# Patient Record
Sex: MALE | Race: BLACK OR AFRICAN AMERICAN | NOT HISPANIC OR LATINO | Employment: FULL TIME | URBAN - METROPOLITAN AREA
[De-identification: names, ages, dates, MRNs, and addresses within clinical notes are randomized per-mention and may not be internally consistent; named-entity substitution may affect disease eponyms.]

---

## 2019-06-28 ENCOUNTER — HOSPITAL ENCOUNTER (EMERGENCY)
Facility: HOSPITAL | Age: 64
Discharge: HOME | End: 2019-06-28
Attending: EMERGENCY MEDICINE
Payer: COMMERCIAL

## 2019-06-28 VITALS
HEART RATE: 100 BPM | WEIGHT: 205 LBS | RESPIRATION RATE: 18 BRPM | HEIGHT: 70 IN | SYSTOLIC BLOOD PRESSURE: 169 MMHG | OXYGEN SATURATION: 96 % | TEMPERATURE: 99 F | DIASTOLIC BLOOD PRESSURE: 93 MMHG | BODY MASS INDEX: 29.35 KG/M2

## 2019-06-28 DIAGNOSIS — S92.356A CLOSED NONDISPLACED FRACTURE OF FIFTH METATARSAL BONE, UNSPECIFIED LATERALITY, INITIAL ENCOUNTER: Primary | ICD-10-CM

## 2019-06-28 DIAGNOSIS — T14.8XXA CONTUSION: ICD-10-CM

## 2019-06-28 PROCEDURE — 99283 EMERGENCY DEPT VISIT LOW MDM: CPT | Mod: 25

## 2019-06-28 PROCEDURE — 29515 APPLICATION SHORT LEG SPLINT: CPT | Mod: LT

## 2019-06-28 RX ORDER — IBUPROFEN 600 MG/1
600 TABLET ORAL EVERY 6 HOURS PRN
Qty: 20 TABLET | Refills: 0 | Status: SHIPPED | OUTPATIENT
Start: 2019-06-28 | End: 2019-08-26

## 2019-06-29 NOTE — ED PROVIDER NOTES
Encounter Date: 6/28/2019       History     Chief Complaint   Patient presents with    Foot Injury     Patient was moving a container onto an airplane when he hit his foot on the side of the container. Local swelling and deformity noted to R ventral foot.      HPI  Review of patient's allergies indicates:  No Known Allergies  History reviewed. No pertinent past medical history.  History reviewed. No pertinent surgical history.  Family History   Problem Relation Age of Onset    Hypertension Mother     Cancer Maternal Grandmother      Social History     Tobacco Use    Smoking status: Current Every Day Smoker     Packs/day: 1.00     Years: 4.00     Pack years: 4.00     Types: Cigarettes    Smokeless tobacco: Never Used   Substance Use Topics    Alcohol use: Never     Frequency: Never    Drug use: Not on file     Review of Systems    Physical Exam     Initial Vitals [06/28/19 2055]   BP Pulse Resp Temp SpO2   (!) 169/93 100 18 98.5 °F (36.9 °C) 96 %      MAP       --         Physical Exam    ED Course   Procedures  Labs Reviewed - No data to display       Imaging Results          X-Ray Foot Complete Right (Final result)  Result time 06/28/19 22:02:25    Final result by Vikki Raman MD (06/28/19 22:02:25)                 Impression:      Acute nondisplaced fracture of the 5th metatarsal neck.  Mild dorsal soft tissue swelling.      Electronically signed by: Vikki Raman  Date:    06/28/2019  Time:    22:02             Narrative:    EXAMINATION:  THREE VIEWS OF THE RIGHT FOOT    CLINICAL HISTORY:  Other injury of unspecified body region, initial encounter    TECHNIQUE:  AP, lateral, and oblique view of the right foot    COMPARISON:  None.    FINDINGS:  Soft tissue swelling is noted over the dorsum of the foot.  Three views of the right foot demonstrate a nondisplaced acute fracture of the 5th metatarsal neck.  Also noted is or remote fracture involving a sesamoid bone at the plantar aspect of the 1st  metatarsal head.  Recommend correlation with point tenderness.  Moderate degenerative changes are seen involving the midfoot.  Moderate calcaneal spurring is noted.  Spurring is also seen at the base of the 5th metatarsal.                                                      Clinical Impression:   {Add your Clinical Impression here. If you haven't documented one yet, please pend the note, finalize a Clinical Impression, and refresh your note before signing.:[id number]}

## 2019-06-29 NOTE — ED PROVIDER NOTES
Encounter Date: 6/28/2019    SCRIBE #1 NOTE: I, Estephanie Pratt, am scribing for, and in the presence of,  Dr. Lefort. I have scribed the entire note.       History     Chief Complaint   Patient presents with    Foot Injury     Patient was moving a container onto an airplane when he hit his foot on the side of the container. Local swelling and deformity noted to R ventral foot.      Jarrell Carreno is a 63 y.o. male who  has no past medical history on file.    The patient presents to the ED due to right foot pain. He reports onset symptoms was just prior to arrival in the ED. 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 Since, then the patient has been having pain and swelling to the right foot. The patient denies any redness, drainage, open wounds, numbness, tingling or weakness in the right foot. He has not tried any medications for pain.     The history is provided by the patient.     Review of patient's allergies indicates:  No Known Allergies  History reviewed. No pertinent past medical history.  History reviewed. No pertinent surgical history.  Family History   Problem Relation Age of Onset    Hypertension Mother     Cancer Maternal Grandmother      Social History     Tobacco Use    Smoking status: Current Every Day Smoker     Packs/day: 1.00     Years: 4.00     Pack years: 4.00     Types: Cigarettes    Smokeless tobacco: Never Used   Substance Use Topics    Alcohol use: Never     Frequency: Never    Drug use: Not on file     Review of Systems   Constitutional: Negative for chills and fever.   HENT: Negative for congestion, ear pain, rhinorrhea and sore throat.    Respiratory: Negative for cough, shortness of breath and wheezing.    Cardiovascular: Negative for chest pain and palpitations.   Gastrointestinal: Negative for abdominal pain, diarrhea, nausea and vomiting.   Genitourinary: Negative for dysuria and hematuria.   Musculoskeletal: Negative for  back pain, myalgias and neck pain.        +right foot pain.    Skin: Negative for rash.   Neurological: Negative for dizziness, weakness, light-headedness and headaches.   Psychiatric/Behavioral: Negative for confusion.       Physical Exam     Initial Vitals [06/28/19 2055]   BP Pulse Resp Temp SpO2   (!) 169/93 100 18 98.5 °F (36.9 °C) 96 %      MAP       --         Physical Exam    Nursing note and vitals reviewed.  Constitutional: He appears well-developed and well-nourished. He is not diaphoretic. No distress.   HENT:   Head: Normocephalic and atraumatic.   Mouth/Throat: Oropharynx is clear and moist.   Eyes: Conjunctivae and EOM are normal.   Neck: Normal range of motion. Neck supple.   Cardiovascular: Intact distal pulses.   Pulmonary/Chest: No respiratory distress.   Musculoskeletal: Normal range of motion. He exhibits edema and tenderness.   Tenderness and swelling to dorsum of right foot. Normal ROM of malleoli without tenderness. 2+ DP and PT pulses.    Neurological: He is alert and oriented to person, place, and time. He has normal strength.   Skin: Skin is warm and dry. Capillary refill takes less than 2 seconds. No rash noted.         ED Course   Splint Application  Date/Time: 6/28/2019 10:38 PM  Performed by: Guy J. Lefort, MD  Authorized by: Guy J. Lefort, MD   Consent Done: Not Needed  Location details: right leg  Splint type: short leg  Supplies used: plaster  Post-procedure: The splinted body part was neurovascularly unchanged following the procedure.  Patient tolerance: Patient tolerated the procedure well with no immediate complications        Labs Reviewed - No data to display       Imaging Results          X-Ray Foot Complete Right (Final result)  Result time 06/28/19 22:02:25    Final result by Vikki Raman MD (06/28/19 22:02:25)                 Impression:      Acute nondisplaced fracture of the 5th metatarsal neck.  Mild dorsal soft tissue swelling.      Electronically signed  by: Vikki Lamine  Date:    06/28/2019  Time:    22:02             Narrative:    EXAMINATION:  THREE VIEWS OF THE RIGHT FOOT    CLINICAL HISTORY:  Other injury of unspecified body region, initial encounter    TECHNIQUE:  AP, lateral, and oblique view of the right foot    COMPARISON:  None.    FINDINGS:  Soft tissue swelling is noted over the dorsum of the foot.  Three views of the right foot demonstrate a nondisplaced acute fracture of the 5th metatarsal neck.  Also noted is or remote fracture involving a sesamoid bone at the plantar aspect of the 1st metatarsal head.  Recommend correlation with point tenderness.  Moderate degenerative changes are seen involving the midfoot.  Moderate calcaneal spurring is noted.  Spurring is also seen at the base of the 5th metatarsal.                                 Medical Decision Making:   Differential Diagnosis:   Fracture contusion sprain  Clinical Tests:   Radiological Study: Ordered and Reviewed  ED Management:  Nondisplaced 5th metatarsal fracture at noted.  Placed in short-leg splint and crutches with instructions to follow up with occupational medicine.  Referral placed for occupational health follow-up.                      Clinical Impression:     1. Closed nondisplaced fracture of fifth metatarsal bone, unspecified laterality, initial encounter    2. Contusion        Disposition:   Disposition: Discharged  Condition: Stable    Scribe attestation I, Dr. Guy Lefort, personally performed the services described in this documentation. All medical record entries made by the scribe were at my direction and in my presence. I have reviewed the chart and agree that the record reflects my personal performance and is accurate and complete. Guy Lefort, MD.  10:17 PM 06/28/2019                      Guy J. Lefort, MD  06/28/19 [id number]

## 2019-06-29 NOTE — DISCHARGE INSTRUCTIONS
Follow-up with [facility] on Monday for work restriction follow-up and further management

## 2019-06-29 NOTE — ED TRIAGE NOTES
PT [place] when one of the canisters rolled onto the side of his foot. Pt denies numbness and tingling. Pt states he can feel touch to foot. Pt states pain is 7 out of 10. Pt states he came straight from work- has not taken anything for pain or swelling. Pt states he takes 1 Hickman's aspirin per day.

## 2019-07-01 ENCOUNTER — OFFICE VISIT (OUTPATIENT)
Dept: URGENT CARE | Facility: CLINIC | Age: 64
End: 2019-07-01
Payer: COMMERCIAL

## 2019-07-01 VITALS
BODY MASS INDEX: 29.4 KG/M2 | HEIGHT: 71 IN | WEIGHT: 210 LBS | DIASTOLIC BLOOD PRESSURE: 90 MMHG | SYSTOLIC BLOOD PRESSURE: 160 MMHG | TEMPERATURE: 98 F | HEART RATE: 88 BPM
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
## 2019-07-01 DIAGNOSIS — S92.354A CLOSED NONDISPLACED FRACTURE OF FIFTH METATARSAL BONE OF RIGHT FOOT, INITIAL ENCOUNTER: Primary | ICD-10-CM

## 2019-07-01 PROCEDURE — 99203 PR OFFICE/OUTPT VISIT, NEW, LEVL III, 30-44 MIN: ICD-10-PCS | Mod: S$GLB,,, | Performed by: PHYSICIAN ASSISTANT

## 2019-07-01 PROCEDURE — 99203 OFFICE O/P NEW LOW 30 MIN: CPT | Mod: S$GLB,,, | Performed by: PHYSICIAN ASSISTANT

## 2019-07-01 NOTE — LETTER
[facility] [place]  [address] [address]  Phone: [phone]  Fax: [phone]  [facility]: [phone]    Pt Name: Jarrell Carreno Jr.  Injury Date: 06/28/2019   Employee ID:  Date of FIRST Treatment: 07/01/2019   Company: [employer]      Appointment Time: 10:40 AM Arrived: 1030 AM   Provider: Andrew Ruby PA-C Time Out: 1305 PM      Office Treatment:   EXAM  REVIEWED RECORDS  REFERRAL TO ORTHO ONCE APPROVED  DISCHARGED TO ORTHO ONCE APPROVED      1. Closed nondisplaced fracture of fifth metatarsal bone of right foot, initial encounter    2. Work related injury          Patient Instructions: Referral to specialist to be scheduled, once authorized, Use splint as directed, Use Crutches as directed      Restrictions: Discharged to Ortho/Neuro/Opthomologist/Surgeon, Sedentary work only, Sit down work only     Return Appointment: NONE

## 2019-07-01 NOTE — PATIENT INSTRUCTIONS
Foot Fracture  You have a broken bone (fracture) in your foot. This will cause pain, swelling, and often bruising. It will usually take about 4 to 8 weeks to heal. A foot fracture may be treated with a special shoe, splint, cast, or boot.  Home care  Follow these guidelines when caring for yourself at home:  · You may be given a splint, cast, shoe, or boot to keep the injured area from moving. Unless you were told otherwise, use crutches or a walker. Dont put weight on the injured foot until your health care provider says you can do so. (You can rent crutches and a walker at many pharmacies and surgical or orthopedic supply stores.) Dont put weight on a splint, or it will break.  · Keep your leg elevated to reduce pain and swelling. When sleeping, put a pillow under the injured leg. When sitting, support the injured leg so it is above your waist. This is very important during the first 2 days (48 hours).  · Put an ice pack on the injured area. Do this for 20 minutes every 1 to 2 hours the first day for pain relief. You can make an ice pack by wrapping a plastic bag of ice cubes in a thin towel. As the ice melts, be careful that the splint, cast, boot, or shoe doesnt get wet. You can place the ice pack directly over the splint or cast. Unless told otherwise, you can open the boot or shoe to apply the ice pack. Continue using the ice pack 3 to 4 times a day for the next 2 days. Then use the ice pack as needed to ease pain and swelling.  · Keep the splint, cast, boot, or shoe dry. When bathing, protect it with a large plastic bag, rubber-banded at the top end. If a fiberglass splint or cast or boot gets wet, you can dry it with a hair dryer. Unless told otherwise, you can take off the boot or shoe to bathe.  · You may use acetaminophen or ibuprofen to control pain, unless another pain medicine was prescribed. If you have chronic liver or kidney disease, talk with your healthcare provider before using these  medicines. Also talk with your provider if youve had a stomach ulcer or gastrointestinal bleeding.  · Dont put creams or objects under the cast if you have itching.  Follow-up care  Follow up with your healthcare provider, or as advised. This is to make sure the bone is healing the way it should. If you were given a splint, it may be changed to a cast or boot at your follow-up visit.  X-rays may be taken. You will be told of any new findings that may affect your care.  When to seek medical advice  Call your healthcare provider right away if any of these occur:  · The cast or splint cracks  · The plaster cast or splint becomes wet or soft  · The fiberglass cast or splint stays wet for more than 24 hours  · Bad odor from the cast or wound fluid stains the cast  · Tightness or pain under the cast or splint gets worse  · Toes become swollen, cold, blue, numb, or tingly  · You cant move your toes  · Skin around cast or splint becomes red  · Fever of 100.4ºF (38ºC) or higher, or as directed by your healthcare provider  Date Last Reviewed: 2/1/2017  © 2434-2349 [employer]. [address] [place] [address], [place] [address]. All rights reserved. This information is not intended as a substitute for professional medical care. Always follow your healthcare professional's instructions.

## 2019-07-01 NOTE — PROGRESS NOTES
Subjective:       Patient ID: Jarrell Carreno Jr. is a 63 y.o. male.    Chief Complaint: Foot Injury (right 6/28/19)    Patient is  [employer]  for  [employer]. Patient is a follow up from ED 6/28/19 for a right acute non displaced fx of 5th metatarsal. Wearing splint, crutches, advil/ibuprofen for pain 6/10. mjb    Foot Injury    The incident occurred 3 to 5 days ago. The incident occurred at work. The injury mechanism was a direct blow. The pain is present in the right foot. The pain is at a severity of 6/10. The pain is moderate. The pain has been constant since onset. Associated symptoms include an inability to bear weight. He reports no foreign bodies present. The symptoms are aggravated by weight bearing, movement and palpation. He has tried non-weight bearing and NSAIDs for the symptoms. The treatment provided mild relief.     Review of Systems   Constitution: Negative for chills and fever.   HENT: Negative for sore throat.    Eyes: Negative for blurred vision.   Cardiovascular: Negative for chest pain.   Respiratory: Negative for shortness of breath.    Skin: Negative for rash.   Musculoskeletal: Positive for joint pain. Negative for back pain.   Gastrointestinal: Negative for abdominal pain, diarrhea, nausea and vomiting.   Neurological: Negative for headaches.   Psychiatric/Behavioral: The patient is not nervous/anxious.        Objective:      Physical Exam   Constitutional: He appears well-developed and well-nourished. He is active. No distress.   HENT:   Head: Normocephalic and atraumatic.   Right Ear: Hearing and external ear normal.   Left Ear: Hearing and external ear normal.   Nose: Nose normal. No nasal deformity. No epistaxis.   Mouth/Throat: Oropharynx is clear and moist and mucous membranes are normal.   Eyes: Conjunctivae and lids are normal. No scleral icterus.   Neck: Trachea normal and normal range of motion.   Cardiovascular: Intact distal pulses and normal pulses.    Pulmonary/Chest: Effort normal. No stridor. No respiratory distress.   Musculoskeletal:        Right foot: There is decreased range of motion, tenderness and swelling. There is normal capillary refill and no deformity.        Feet:    Neurological: He is alert. He has normal strength. He is not disoriented. No sensory deficit. GCS eye subscore is 4. GCS verbal subscore is 5. GCS motor subscore is 6.   Skin: Skin is warm, dry and intact. Capillary refill takes less than 2 seconds. He is not diaphoretic.   Psychiatric: He has a normal mood and affect. His speech is normal and behavior is normal. He is attentive.   Nursing note and vitals reviewed.      Assessment:       1. Closed nondisplaced fracture of fifth metatarsal bone of right foot, initial encounter    2. Work related injury        Plan:            Patient Instructions: Referral to specialist to be scheduled, once authorized, Use splint as directed, Use Crutches as directed   Restrictions: Discharged to Ortho/Neuro/Opthomologist/Surgeon, Sedentary work only, Sit down work only  Follow up if symptoms worsen or fail to improve.

## 2019-07-19 ENCOUNTER — OFFICE VISIT (OUTPATIENT)
Dept: PODIATRY | Facility: CLINIC | Age: 64
End: 2019-07-19
Payer: COMMERCIAL

## 2019-07-19 ENCOUNTER — HOSPITAL ENCOUNTER (OUTPATIENT)
Dept: RADIOLOGY | Facility: HOSPITAL | Age: 64
Discharge: HOME | End: 2019-07-19
Attending: PODIATRIST
Payer: COMMERCIAL

## 2019-07-19 VITALS
WEIGHT: 210 LBS | HEART RATE: 79 BPM | BODY MASS INDEX: 29.4 KG/M2 | DIASTOLIC BLOOD PRESSURE: 87 MMHG | HEIGHT: 71 IN | SYSTOLIC BLOOD PRESSURE: 178 MMHG

## 2019-07-19 DIAGNOSIS — S92.354A CLOSED NONDISPLACED FRACTURE OF FIFTH METATARSAL BONE OF RIGHT FOOT, INITIAL ENCOUNTER: ICD-10-CM

## 2019-07-19 DIAGNOSIS — S92.354A CLOSED NONDISPLACED FRACTURE OF FIFTH METATARSAL BONE OF RIGHT FOOT, INITIAL ENCOUNTER: Primary | ICD-10-CM

## 2019-07-19 PROCEDURE — 99203 OFFICE O/P NEW LOW 30 MIN: CPT | Mod: S$GLB,,, | Performed by: PODIATRIST

## 2019-07-19 PROCEDURE — 73630 X-RAY EXAM OF FOOT: CPT | Mod: TC,FY,RT

## 2019-07-19 PROCEDURE — 99999 PR PBB SHADOW E&M-EST. PATIENT-LVL III: CPT | Mod: PBBFAC,,, | Performed by: PODIATRIST

## 2019-07-19 PROCEDURE — 73630 XR FOOT COMPLETE 3 VIEW RIGHT: ICD-10-PCS | Mod: 26,RT,, | Performed by: RADIOLOGY

## 2019-07-19 PROCEDURE — 99203 PR OFFICE/OUTPT VISIT, NEW, LEVL III, 30-44 MIN: ICD-10-PCS | Mod: S$GLB,,, | Performed by: PODIATRIST

## 2019-07-19 PROCEDURE — 73630 X-RAY EXAM OF FOOT: CPT | Mod: 26,RT,, | Performed by: RADIOLOGY

## 2019-07-19 PROCEDURE — 99999 PR PBB SHADOW E&M-EST. PATIENT-LVL III: ICD-10-PCS | Mod: PBBFAC,,, | Performed by: PODIATRIST

## 2019-07-19 NOTE — LETTER
July 21, 2019      Andrew Ruby PA-C  [address] [facility]  [address]  [place] [address]           [facility]  [address], [address]  [facility] [address]  Phone: [phone]  Fax: [phone]          Patient: Jarrell Carreno Jr.   MR Number: [id number]   YOB: 1955   Date of Visit: 7/19/2019       Dear Andrew Ruby:    Thank you for referring Jarrell Carreno to me for evaluation. Attached you will find relevant portions of my assessment and plan of care.    If you have questions, please do not hesitate to call me. I look forward to following Jarrell Carreno along with you.    Sincerely,    Terrence Brand, ROMI    Enclosure  CC:  No Recipients    If you would like to receive this communication electronically, please contact [email or web address] or [phone] to request more information on [employer] Link access.    For providers and/or their staff who would like to refer a patient to [facility], please contact us through our one-stop-shop provider referral line, [facility], at [phone].    If you feel you have received this communication in error or would no longer like to receive these types of communications, please e-mail [email or web address]

## 2019-07-21 PROBLEM — S92.354A CLOSED NONDISPLACED FRACTURE OF FIFTH RIGHT METATARSAL BONE: Status: ACTIVE | Noted: 2019-07-21

## 2019-08-26 ENCOUNTER — OFFICE VISIT (OUTPATIENT)
Dept: PODIATRY | Facility: CLINIC | Age: 64
End: 2019-08-26
Payer: OTHER MISCELLANEOUS

## 2019-08-26 ENCOUNTER — HOSPITAL ENCOUNTER (OUTPATIENT)
Dept: RADIOLOGY | Facility: HOSPITAL | Age: 64
Discharge: HOME | End: 2019-08-26
Attending: PODIATRIST
Payer: COMMERCIAL

## 2019-08-26 VITALS
BODY MASS INDEX: 29.4 KG/M2 | WEIGHT: 210 LBS | HEART RATE: 98 BPM | SYSTOLIC BLOOD PRESSURE: 157 MMHG | HEIGHT: 71 IN | DIASTOLIC BLOOD PRESSURE: 91 MMHG

## 2019-08-26 DIAGNOSIS — S92.301D FRACTURE OF UNSPECIFIED METATARSAL BONE(S), RIGHT FOOT, SUBSEQUENT ENCOUNTER FOR FRACTURE WITH ROUTINE HEALING: Primary | ICD-10-CM

## 2019-08-26 DIAGNOSIS — S92.354A CLOSED NONDISPLACED FRACTURE OF FIFTH METATARSAL BONE OF RIGHT FOOT, INITIAL ENCOUNTER: ICD-10-CM

## 2019-08-26 PROCEDURE — 73630 X-RAY EXAM OF FOOT: CPT | Mod: TC,PN,RT

## 2019-08-26 PROCEDURE — 73630 XR FOOT COMPLETE 3 VIEW RIGHT: ICD-10-PCS | Mod: 26,RT,, | Performed by: RADIOLOGY

## 2019-08-26 PROCEDURE — 99213 PR OFFICE/OUTPT VISIT, EST, LEVL III, 20-29 MIN: ICD-10-PCS | Mod: S$GLB,,, | Performed by: PODIATRIST

## 2019-08-26 PROCEDURE — 99999 PR PBB SHADOW E&M-EST. PATIENT-LVL III: CPT | Mod: PBBFAC,,, | Performed by: PODIATRIST

## 2019-08-26 PROCEDURE — 99213 OFFICE O/P EST LOW 20 MIN: CPT | Mod: S$GLB,,, | Performed by: PODIATRIST

## 2019-08-26 PROCEDURE — 99999 PR PBB SHADOW E&M-EST. PATIENT-LVL III: ICD-10-PCS | Mod: PBBFAC,,, | Performed by: PODIATRIST

## 2019-08-26 PROCEDURE — 73630 X-RAY EXAM OF FOOT: CPT | Mod: 26,RT,, | Performed by: RADIOLOGY

## 2019-08-26 NOTE — LETTER
August 26, 2019      [facility]  [address], [address]  [place] [address]  Phone: [phone]  Fax: [phone]       Patient: Jarrell Carreno   YOB: 1955  Date of Visit: 08/26/2019    To Whom It May Concern:    Shelly Carreno  was at [facility] on 08/26/2019. He may return to work/school on 9/3/2019 without restrictions. If you have any questions or concerns, or if I can be of further assistance, please do not hesitate to contact me.    Sincerely,            Jenny Ruiz MA

## 2019-08-26 NOTE — PATIENT INSTRUCTIONS
Transition from orthopedic boot to tennis shoe with gradual 1 hour daily increments as discussed. Patient may gradually increase activity as discussed    Returning to work on 9/3/19 without restrictions

## 2019-08-27 NOTE — PROGRESS NOTES
"Subjective:      Patient ID: Jarrell Carreno Jr. is a 63 y.o. male.    Chief Complaint: Follow-up (right foot )    follow-up from emergency department treatment of fracture right foot that occurred approximately 3 weeks ago. Relates injury occurred while at work.  Describes something falling on his foot. X-ray taken 06/20/2019 revealed a nondisplaced fracture of the right foot 5th metatarsal neck.  He was placed in a posterior splint and told to follow up.  He has been weight-bearing to the foot utilizing the splint AMA.  Relates only mild pain today.  Currently on disability.    Vitals:    08/26/19 1007   BP: (!) 157/91   Pulse: 98   Weight: 95.3 kg (210 lb)   Height: 5' 11" (1.803 m)   PainSc: 0-No pain      History reviewed. No pertinent past medical history.    History reviewed. No pertinent surgical history.    Family History   Problem Relation Age of Onset    Hypertension Mother     Cancer Maternal Grandmother        Social History     Socioeconomic History    Marital status:      Spouse name: Not on file    Number of children: Not on file    Years of education: Not on file    Highest education level: Not on file   Occupational History    Not on file   Social Needs    Financial resource strain: Not on file    Food insecurity:     Worry: Not on file     Inability: Not on file    Transportation needs:     Medical: Not on file     Non-medical: Not on file   Tobacco Use    Smoking status: Current Every Day Smoker     Packs/day: 1.00     Years: 4.00     Pack years: 4.00     Types: Cigarettes    Smokeless tobacco: Never Used   Substance and Sexual Activity    Alcohol use: Never     Frequency: Never    Drug use: Not on file    Sexual activity: Yes     Partners: Female   Lifestyle    Physical activity:     Days per week: Not on file     Minutes per session: Not on file    Stress: Not on file   Relationships    Social connections:     Talks on phone: Not on file     Gets together: Not on file     " Attends Congregational service: Not on file     Active member of club or organization: Not on file     Attends meetings of clubs or organizations: Not on file     Relationship status: Not on file   Other Topics Concern    Not on file   Social History Narrative    Not on file       Current Outpatient Medications   Medication Sig Dispense Refill    omeprazole (PRILOSEC) 10 MG capsule Take 10 mg by mouth once daily.      ranitidine (ZANTAC) 150 MG capsule Take 150 mg by mouth 2 (two) times daily.       No current facility-administered medications for this visit.        Review of patient's allergies indicates:  No Known Allergies      Review of Systems   Constitution: Negative for chills, fever and malaise/fatigue.   HENT: Negative for congestion.    Cardiovascular: Negative for chest pain, claudication and leg swelling.   Respiratory: Negative for cough and shortness of breath.    Skin: Negative for color change, itching and poor wound healing.   Musculoskeletal: Negative for back pain, joint pain, muscle cramps and muscle weakness.   Gastrointestinal: Negative for nausea and vomiting.   Neurological: Negative for numbness, paresthesias and weakness.   Psychiatric/Behavioral: Negative for altered mental status.           Objective:      Physical Exam   Constitutional: He is oriented to person, place, and time. He appears well-developed and well-nourished. No distress.   Cardiovascular: Intact distal pulses.   Pulses:       Dorsalis pedis pulses are 2+ on the right side, and 2+ on the left side.        Posterior tibial pulses are 2+ on the right side, and 2+ on the left side.   Musculoskeletal:   No pain on palpation right 5th metatarsal.  No pain with 5 MTP range of motion right foot. No localized edema or discoloration right 5th metatarsal or toe.  No pain on palpation of the 4th toe right foot. No pain with range of motion 4th toe right foot.    Pes planus foot type bilateral.   Neurological: He is alert and oriented  to person, place, and time. He has normal strength. No sensory deficit.   Skin: Skin is warm, dry and intact. Capillary refill takes less than 2 seconds. No ecchymosis, no lesion, no petechiae and no rash noted. He is not diaphoretic. No cyanosis or erythema. No pallor. Nails show no clubbing.   No open lesions macerations bilateral foot.             Assessment:       Encounter Diagnosis   Name Primary?    Fracture of unspecified metatarsal bone(s), right foot, subsequent encounter for fracture with routine healing Yes         Plan:       Jarrell was seen today for follow-up.    Diagnoses and all orders for this visit:    Fracture of unspecified metatarsal bone(s), right foot, subsequent encounter for fracture with routine healing      I counseled the patient on his conditions, their implications and medical management.    Clinically patient is asymptomatic from previous right 5th metatarsal fracture and right 4th toe proximal phalanx fracture.  Reviewed the right foot x-ray nose maintained alignment with progressive healing of the 5th metatarsal neck and 4th toe proximal phalanx.  No change in alignment.    Transition from orthopedic boot to tennis shoe with gradual 1 hour daily increments as discussed. Patient may gradually increase activity as discussed    Patient is released back to work on 09/03/2019 without restrictions.

## 2019-08-30 ENCOUNTER — TELEPHONE (OUTPATIENT)
Dept: PODIATRY | Facility: CLINIC | Age: 64
End: 2019-08-30

## 2019-08-30 NOTE — TELEPHONE ENCOUNTER
Left message for pt letting him know I need him to sign a form and then I will fax over the paper work

## 2019-08-30 NOTE — TELEPHONE ENCOUNTER
----- Message from Magali Everett sent at 8/30/2019  8:02 AM CDT -----  Contact: Self [phone]  Patient is calling to talk to nurse in regards to his paper work.